# Patient Record
Sex: FEMALE | Race: WHITE | NOT HISPANIC OR LATINO | Employment: UNEMPLOYED | URBAN - METROPOLITAN AREA
[De-identification: names, ages, dates, MRNs, and addresses within clinical notes are randomized per-mention and may not be internally consistent; named-entity substitution may affect disease eponyms.]

---

## 2017-03-22 ENCOUNTER — HOSPITAL ENCOUNTER (OUTPATIENT)
Dept: RADIOLOGY | Facility: MEDICAL CENTER | Age: 47
End: 2017-03-22
Attending: FAMILY MEDICINE
Payer: COMMERCIAL

## 2017-03-22 DIAGNOSIS — Z12.39 SCREENING FOR BREAST CANCER: ICD-10-CM

## 2017-03-22 DIAGNOSIS — Z00.00 ROUTINE GENERAL MEDICAL EXAMINATION AT A HEALTH CARE FACILITY: ICD-10-CM

## 2017-03-22 PROCEDURE — 77063 BREAST TOMOSYNTHESIS BI: CPT

## 2018-04-13 ENCOUNTER — HOSPITAL ENCOUNTER (OUTPATIENT)
Dept: HOSPITAL 8 - CFH | Age: 48
Discharge: HOME | End: 2018-04-13
Attending: NURSE PRACTITIONER
Payer: COMMERCIAL

## 2018-04-13 DIAGNOSIS — Z12.31: Primary | ICD-10-CM

## 2018-04-13 PROCEDURE — 77063 BREAST TOMOSYNTHESIS BI: CPT

## 2019-01-16 ENCOUNTER — HOSPITAL ENCOUNTER (OUTPATIENT)
Dept: HOSPITAL 8 - CFH | Age: 49
Discharge: HOME | End: 2019-01-16
Attending: NURSE PRACTITIONER
Payer: COMMERCIAL

## 2019-01-16 DIAGNOSIS — R10.11: Primary | ICD-10-CM

## 2019-01-16 PROCEDURE — 76700 US EXAM ABDOM COMPLETE: CPT

## 2019-02-20 ENCOUNTER — HOSPITAL ENCOUNTER (OUTPATIENT)
Dept: HOSPITAL 8 - PETCFH | Age: 49
Discharge: HOME | End: 2019-02-20
Attending: INTERNAL MEDICINE
Payer: COMMERCIAL

## 2019-02-20 DIAGNOSIS — K76.0: ICD-10-CM

## 2019-02-20 DIAGNOSIS — R14.0: ICD-10-CM

## 2019-02-20 DIAGNOSIS — R11.0: ICD-10-CM

## 2019-02-20 DIAGNOSIS — R07.9: ICD-10-CM

## 2019-02-20 DIAGNOSIS — R10.13: Primary | ICD-10-CM

## 2019-02-20 PROCEDURE — 78227 HEPATOBIL SYST IMAGE W/DRUG: CPT

## 2019-02-20 PROCEDURE — A9537 TC99M MEBROFENIN: HCPCS

## 2019-04-11 ENCOUNTER — HOSPITAL ENCOUNTER (OUTPATIENT)
Dept: HOSPITAL 8 - STAR | Age: 49
Discharge: HOME | End: 2019-04-11
Attending: SURGERY
Payer: COMMERCIAL

## 2019-04-11 VITALS — SYSTOLIC BLOOD PRESSURE: 118 MMHG | DIASTOLIC BLOOD PRESSURE: 65 MMHG

## 2019-04-11 DIAGNOSIS — Z02.9: Primary | ICD-10-CM

## 2019-04-15 ENCOUNTER — HOSPITAL ENCOUNTER (OUTPATIENT)
Dept: HOSPITAL 8 - OUT | Age: 49
Discharge: HOME | End: 2019-04-15
Attending: SURGERY
Payer: COMMERCIAL

## 2019-04-15 VITALS — BODY MASS INDEX: 28.28 KG/M2 | HEIGHT: 70 IN | WEIGHT: 197.53 LBS

## 2019-04-15 DIAGNOSIS — J45.909: ICD-10-CM

## 2019-04-15 DIAGNOSIS — Z98.890: ICD-10-CM

## 2019-04-15 DIAGNOSIS — K82.8: Primary | ICD-10-CM

## 2019-04-15 DIAGNOSIS — Z88.1: ICD-10-CM

## 2019-04-15 DIAGNOSIS — E78.00: ICD-10-CM

## 2019-04-15 DIAGNOSIS — Z72.89: ICD-10-CM

## 2019-04-15 LAB — HCG UR SG: 1.01 (ref 1–1.03)

## 2019-04-15 PROCEDURE — 81025 URINE PREGNANCY TEST: CPT

## 2019-04-15 PROCEDURE — 47562 LAPAROSCOPIC CHOLECYSTECTOMY: CPT

## 2019-04-15 PROCEDURE — 88304 TISSUE EXAM BY PATHOLOGIST: CPT

## 2019-04-15 PROCEDURE — C1729 CATH, DRAINAGE: HCPCS

## 2019-04-15 RX ADMIN — OXYCODONE HYDROCHLORIDE PRN MG: 5 SOLUTION ORAL at 14:43

## 2019-04-15 RX ADMIN — FENTANYL CITRATE PRN MCG: 50 INJECTION INTRAMUSCULAR; INTRAVENOUS at 13:30

## 2019-04-15 RX ADMIN — FENTANYL CITRATE PRN MCG: 50 INJECTION INTRAMUSCULAR; INTRAVENOUS at 12:43

## 2019-04-15 RX ADMIN — FENTANYL CITRATE PRN MCG: 50 INJECTION INTRAMUSCULAR; INTRAVENOUS at 13:25

## 2019-04-15 RX ADMIN — FENTANYL CITRATE PRN MCG: 50 INJECTION INTRAMUSCULAR; INTRAVENOUS at 12:53

## 2019-04-15 RX ADMIN — FENTANYL CITRATE PRN MCG: 50 INJECTION INTRAMUSCULAR; INTRAVENOUS at 13:20

## 2019-04-15 RX ADMIN — OXYCODONE HYDROCHLORIDE PRN MG: 5 SOLUTION ORAL at 12:46

## 2019-04-29 ENCOUNTER — HOSPITAL ENCOUNTER (OUTPATIENT)
Dept: HOSPITAL 8 - CFH | Age: 49
Discharge: HOME | End: 2019-04-29
Attending: NURSE PRACTITIONER
Payer: COMMERCIAL

## 2019-04-29 DIAGNOSIS — Z88.1: ICD-10-CM

## 2019-04-29 DIAGNOSIS — Z12.31: Primary | ICD-10-CM

## 2019-04-29 PROCEDURE — 77063 BREAST TOMOSYNTHESIS BI: CPT

## 2019-05-13 ENCOUNTER — HOSPITAL ENCOUNTER (OUTPATIENT)
Dept: HOSPITAL 8 - CFH | Age: 49
Discharge: HOME | End: 2019-05-13
Attending: NURSE PRACTITIONER
Payer: COMMERCIAL

## 2019-05-13 DIAGNOSIS — N63.21: Primary | ICD-10-CM

## 2019-05-23 ENCOUNTER — HOSPITAL ENCOUNTER (OUTPATIENT)
Dept: HOSPITAL 8 - CFH | Age: 49
Discharge: HOME | End: 2019-05-23
Attending: NURSE PRACTITIONER
Payer: COMMERCIAL

## 2019-05-23 DIAGNOSIS — N60.22: Primary | ICD-10-CM

## 2019-05-23 PROCEDURE — 88342 IMHCHEM/IMCYTCHM 1ST ANTB: CPT

## 2019-05-23 PROCEDURE — 19083 BX BREAST 1ST LESION US IMAG: CPT

## 2019-05-23 PROCEDURE — 88341 IMHCHEM/IMCYTCHM EA ADD ANTB: CPT

## 2019-05-23 PROCEDURE — 88305 TISSUE EXAM BY PATHOLOGIST: CPT

## 2021-04-06 ENCOUNTER — TELEPHONE (OUTPATIENT)
Dept: MEDICAL GROUP | Facility: MEDICAL CENTER | Age: 51
End: 2021-04-06

## 2021-04-06 ENCOUNTER — OFFICE VISIT (OUTPATIENT)
Dept: MEDICAL GROUP | Facility: MEDICAL CENTER | Age: 51
End: 2021-04-06
Attending: PHYSICIAN ASSISTANT
Payer: COMMERCIAL

## 2021-04-06 VITALS
HEIGHT: 70 IN | RESPIRATION RATE: 18 BRPM | SYSTOLIC BLOOD PRESSURE: 126 MMHG | WEIGHT: 193.5 LBS | TEMPERATURE: 97.6 F | OXYGEN SATURATION: 97 % | HEART RATE: 85 BPM | BODY MASS INDEX: 27.7 KG/M2 | DIASTOLIC BLOOD PRESSURE: 70 MMHG

## 2021-04-06 DIAGNOSIS — Z00.00 HEALTH CARE MAINTENANCE: ICD-10-CM

## 2021-04-06 DIAGNOSIS — J45.40 MODERATE PERSISTENT ASTHMA WITHOUT COMPLICATION: ICD-10-CM

## 2021-04-06 DIAGNOSIS — Z12.31 ENCOUNTER FOR SCREENING MAMMOGRAM FOR MALIGNANT NEOPLASM OF BREAST: ICD-10-CM

## 2021-04-06 DIAGNOSIS — T78.2XXA ANAPHYLAXIS, INITIAL ENCOUNTER: ICD-10-CM

## 2021-04-06 PROBLEM — J45.909 ASTHMA: Status: ACTIVE | Noted: 2017-03-02

## 2021-04-06 PROBLEM — R53.83 FATIGUE: Status: ACTIVE | Noted: 2017-03-02

## 2021-04-06 PROBLEM — E55.9 VITAMIN D DEFICIENCY: Status: ACTIVE | Noted: 2017-03-02

## 2021-04-06 PROCEDURE — 99204 OFFICE O/P NEW MOD 45 MIN: CPT | Performed by: PHYSICIAN ASSISTANT

## 2021-04-06 PROCEDURE — 99203 OFFICE O/P NEW LOW 30 MIN: CPT | Performed by: PHYSICIAN ASSISTANT

## 2021-04-06 RX ORDER — EPINEPHRINE 0.3 MG/.3ML
0.3 INJECTION SUBCUTANEOUS ONCE
Qty: 0.3 ML | Refills: 5 | Status: SHIPPED | OUTPATIENT
Start: 2021-04-06 | End: 2021-04-06

## 2021-04-06 RX ORDER — MONTELUKAST SODIUM 10 MG/1
10 TABLET ORAL DAILY
Qty: 30 TABLET | Refills: 5 | Status: SHIPPED | OUTPATIENT
Start: 2021-04-06 | End: 2021-12-06

## 2021-04-06 RX ORDER — ALBUTEROL SULFATE 90 UG/1
2 AEROSOL, METERED RESPIRATORY (INHALATION) EVERY 6 HOURS PRN
Qty: 8.5 G | Refills: 11 | Status: SHIPPED | OUTPATIENT
Start: 2021-04-06

## 2021-04-06 RX ORDER — ALBUTEROL SULFATE 90 UG/1
2 AEROSOL, METERED RESPIRATORY (INHALATION) EVERY 6 HOURS PRN
COMMUNITY
End: 2021-04-06 | Stop reason: SDUPTHER

## 2021-04-06 NOTE — PROGRESS NOTES
Chief Complaint   Patient presents with   • Asthma   • Medication Refill   • Requesting Labs     mammogram     Subjective:     HPI:   Betty Chicas is a 51 y.o. female here to establish care  and to discuss the evaluation and management of:    Asthma  This is a chronic condition.  Onset: Symptoms present since childhood.   Symptoms include: wheezing, SOB.   Symptoms are exacerbated in certain seasons, such as summer.  Reports problems with activity/exercise induced coughing, wheezing, or shortness of breath.  She prophylactically takes albuterol prior to exercise.  Sleep has been disturbed nightly due to symptoms during high allergy season.    She uses her albuterol for relief of symptoms daily allergy seasons.  Current medications: Breo once daily and albuterol.     ROS  See HPI.     Allergies   Allergen Reactions   • Amoxicillin Rash       Current medicines (including changes today)  Current Outpatient Medications   Medication Sig Dispense Refill   • EPINEPHrine (EPIPEN 2-DWAIN) 0.3 MG/0.3ML Solution Auto-injector solution for injection Inject 0.3 mL into the shoulder, thigh, or buttocks one time for 1 dose. 0.3 mL 5   • albuterol 108 (90 Base) MCG/ACT Aero Soln inhalation aerosol Inhale 2 Puffs every 6 hours as needed for Shortness of Breath. 8.5 g 11   • Fluticasone Furoate-Vilanterol (BREO ELLIPTA) 200-25 MCG/INH AEROSOL POWDER, BREATH ACTIVATED Inhale 1 Puff every day. 28 Each 5   • montelukast (SINGULAIR) 10 MG Tab Take 1 tablet by mouth every day. 30 tablet 5     No current facility-administered medications for this visit.     She  has a past medical history of Asthma and Hyperlipidemia.  She  has a past surgical history that includes cholecystectomy; tonsillectomy; and dilation and curettage.  Social History     Tobacco Use   • Smoking status: Never Smoker   • Smokeless tobacco: Never Used   Substance Use Topics   • Alcohol use: Yes     Comment: OCC    • Drug use: Never       Family History   Problem Relation  "Age of Onset   • Hypertension Maternal Grandmother    • Lung Disease Neg Hx    • Cancer Neg Hx    • Heart Disease Neg Hx    • Diabetes Neg Hx    • Hyperlipidemia Neg Hx      No family status information on file.       Patient Active Problem List    Diagnosis Date Noted   • Asthma 03/02/2017   • Fatigue 03/02/2017   • Vitamin D deficiency 03/02/2017        Objective:     /70 (BP Location: Left arm, Patient Position: Sitting, BP Cuff Size: Adult long)   Pulse 85   Temp 36.4 °C (97.6 °F) (Temporal)   Resp 18   Ht 1.778 m (5' 10\")   Wt 87.8 kg (193 lb 8 oz)   SpO2 97%  Body mass index is 27.76 kg/m².    Physical Exam:  Physical Exam   Constitutional: She is oriented to person, place, and time and well-developed, well-nourished, and in no distress.   HENT:   Head: Normocephalic.   Right Ear: External ear normal.   Left Ear: External ear normal.   Eyes: Pupils are equal, round, and reactive to light.   Neck: No thyromegaly present.   Cardiovascular: Normal rate, regular rhythm and normal heart sounds.   Pulmonary/Chest: Effort normal and breath sounds normal.   Lymphadenopathy:     She has no cervical adenopathy.        Right: No supraclavicular adenopathy present.        Left: No supraclavicular adenopathy present.   Neurological: She is alert and oriented to person, place, and time. Gait normal.   Skin: Skin is warm and dry.   Psychiatric: Affect and judgment normal.   Vitals reviewed.    Assessment and Plan:     The following treatment plan was discussed:    1. Moderate persistent asthma without complication  - This is a chronic condition.  -Plan: Due to high allergy season approaching we will have her continue with albuterol as needed and high dose Breo once daily.  Encouraged patient to also continue taking Claritin nightly.  We will add on montelukast due to asthma and allergies in an attempt to control her allergies and ultimately decrease the use of her rescue inhaler.  We will follow-up in 6 weeks for " reevaluation.  If she continues to use her rescue inhaler daily and continues to have nighttime awakenings due to asthma symptoms, we will likely need to switch her to a LABA/ICS combination inhaler for daily use.  - albuterol 108 (90 Base) MCG/ACT Aero Soln inhalation aerosol; Inhale 2 Puffs every 6 hours as needed for Shortness of Breath.  Dispense: 8.5 g; Refill: 11  - Fluticasone Furoate-Vilanterol (BREO ELLIPTA) 200-25 MCG/INH AEROSOL POWDER, BREATH ACTIVATED; Inhale 1 Puff every day.  Dispense: 28 Each; Refill: 5  - montelukast (SINGULAIR) 10 MG Tab; Take 1 tablet by mouth every day.  Dispense: 30 tablet; Refill: 5    2. Health care maintenance  - Betty is due for yearly labs. She has been instructed to complete these labs prior to her next follow-up.  - T-TRANSGLUTAMINASE (TTG) IGA; Future  - CBC WITHOUT DIFFERENTIAL; Future  - Comp Metabolic Panel; Future  - HEMOGLOBIN A1C; Future  - Lipid Profile; Future  - TSH WITH REFLEX TO FT4; Future  - VITAMIN D,25 HYDROXY; Future  - VITAMIN B12; Future  - FOLATE; Future    3. Encounter for screening mammogram for malignant neoplasm of breast  - MA-DIAGNOSTIC MAMMO BILAT W/TOMOSYNTHESIS W/CAD; Future    4. Anaphylaxis, initial encounter  - EPINEPHrine (EPIPEN 2-DWAIN) 0.3 MG/0.3ML Solution Auto-injector solution for injection; Inject 0.3 mL into the shoulder, thigh, or buttocks one time for 1 dose.  Dispense: 0.3 mL; Refill: 5       Any change or worsening of signs or symptoms, patient encouraged to follow-up or report to emergency room for further evaluation. Patient verbalizes understanding and agrees.    Follow-Up: Return in about 6 years (around 4/6/2027), or if symptoms worsen or fail to improve, for Med check, F/u labs.      PLEASE NOTE: This dictation was created using voice recognition software. I have made every reasonable attempt to correct obvious errors, but I expect that there are errors of grammar and possibly content that I did not discover before  finalizing the note.

## 2021-04-06 NOTE — PATIENT INSTRUCTIONS
Goals and Recommendations for Weight loss:     - Combination of diet, exercise and behavioral interventions are important!    - Diet: The goal of dietary therapy is to REDUCE the total number of calories consumed per day. Another words, you  should burn more calories during the day than the amount of calories that you eat in a day.     1. Focus on well balanced healthful foods such as DASH diet or Mediterranean style diet.   2. Reduce the amount of refined carbohydrates, processed meats and foods high in salt and trans fat.  3. MODERATION of unprocessed red meats, poultry, eggs and milk  4. Focus on high intake of fruits, nuts, fish, lean white meats (chicken, turkey, etc), veggies, veggie oils, minimally processed whole grains, legumes, and yogurts.     - Exercise: Physical activity helps with weight loss maintenance     1. Exercise should be performed for at least 30 minutes or more, 5-7 days a week to prevent weight gain and improve cardiovascular health.  2.Gradually increase your physical activity as tolerated over time  3. A combination of aerobic (treadmill, bike, walking, hiking, swimming, kickboxing, etc) and resistance training (weight machines, dumbbells, kettle bells, sand bags, medicine balls, or resistance bands)  is preferred.  4. Stay adequately hydrated!     - Behavioral Modifications: Goal is to make long term changes in eating behavior by modifying and monitoring food intake, physical activity and controlling cues and stimuli in the environment that trigger eating.    1. Keep a diary   - Write down what you have eaten and how much you eat everyday.    - Write down what physical activity you performed each day and for how long.   - Write down how much water you drink daily.   - Did you consume more calories then you burned off during the day? (another words, were you in a calorie deficit for the day? - this is our goal).  - Seeing a behavioral specialist will help you track all of these things and  help make adjustments as needed to aid you in success.    Things to Remember:   - Losing weight does not happen over night.  - BABY STEPS! Focus on a goal of losing 5-7% of your body weight and once achieved repeat.  - It requires a lifestyle change.  - Having a good support system is important.  - Losing weight will significantly reduce your risk of: heart disease, high cholesterol, high blood pressure and diabetes!

## 2021-04-06 NOTE — ASSESSMENT & PLAN NOTE
This is a chronic condition.  Onset: Symptoms present since childhood.   Symptoms include: wheezing, SOB.   Symptoms are exacerbated in certain seasons, such as summer.  Reports problems with activity/exercise induced coughing, wheezing, or shortness of breath.  She prophylactically takes albuterol prior to exercise.  Sleep has been disturbed nightly due to symptoms during high allergy season.    She uses her albuterol for relief of symptoms daily allergy seasons.  Current medications: Breo once daily and albuterol.

## 2021-04-07 ENCOUNTER — TELEPHONE (OUTPATIENT)
Dept: MEDICAL GROUP | Facility: MEDICAL CENTER | Age: 51
End: 2021-04-07

## 2021-04-07 NOTE — TELEPHONE ENCOUNTER
DOCUMENTATION OF PAR STATUS:    1. Name of Medication & Dose: Breo Ellipta 200-25mcg     2. Name of Prescription Coverage Company & phone #: [employer]    3. Date Prior Auth Submitted: 4/6/2021    4. What information was given to obtain insurance decision? Medication list, rchart notes    5. Prior Auth Status? Pending    6. Patient Notified: N\A

## 2021-04-08 DIAGNOSIS — J45.40 MODERATE PERSISTENT ASTHMA WITHOUT COMPLICATION: ICD-10-CM

## 2021-04-08 RX ORDER — BUDESONIDE AND FORMOTEROL FUMARATE DIHYDRATE 160; 4.5 UG/1; UG/1
2 AEROSOL RESPIRATORY (INHALATION) 2 TIMES DAILY
Qty: 10.2 G | Refills: 5 | Status: SHIPPED | OUTPATIENT
Start: 2021-04-08

## 2021-04-23 ENCOUNTER — HOSPITAL ENCOUNTER (OUTPATIENT)
Dept: LAB | Facility: MEDICAL CENTER | Age: 51
End: 2021-04-23
Attending: PHYSICIAN ASSISTANT
Payer: COMMERCIAL

## 2021-04-23 DIAGNOSIS — Z00.00 HEALTH CARE MAINTENANCE: ICD-10-CM

## 2021-04-23 LAB
ALBUMIN SERPL BCP-MCNC: 4.1 G/DL (ref 3.2–4.9)
ALBUMIN/GLOB SERPL: 1.3 G/DL
ALP SERPL-CCNC: 54 U/L (ref 30–99)
ALT SERPL-CCNC: 23 U/L (ref 2–50)
ANION GAP SERPL CALC-SCNC: 6 MMOL/L (ref 7–16)
AST SERPL-CCNC: 26 U/L (ref 12–45)
BILIRUB SERPL-MCNC: 0.6 MG/DL (ref 0.1–1.5)
BUN SERPL-MCNC: 13 MG/DL (ref 8–22)
CALCIUM SERPL-MCNC: 9.5 MG/DL (ref 8.5–10.5)
CHLORIDE SERPL-SCNC: 107 MMOL/L (ref 96–112)
CHOLEST SERPL-MCNC: 232 MG/DL (ref 100–199)
CO2 SERPL-SCNC: 24 MMOL/L (ref 20–33)
CREAT SERPL-MCNC: 0.79 MG/DL (ref 0.5–1.4)
ERYTHROCYTE [DISTWIDTH] IN BLOOD BY AUTOMATED COUNT: 46.3 FL (ref 35.9–50)
EST. AVERAGE GLUCOSE BLD GHB EST-MCNC: 94 MG/DL
GLOBULIN SER CALC-MCNC: 3.2 G/DL (ref 1.9–3.5)
GLUCOSE SERPL-MCNC: 95 MG/DL (ref 65–99)
HBA1C MFR BLD: 4.9 % (ref 4–5.6)
HCT VFR BLD AUTO: 45.9 % (ref 37–47)
HDLC SERPL-MCNC: 89 MG/DL
HGB BLD-MCNC: 15.4 G/DL (ref 12–16)
LDLC SERPL CALC-MCNC: 132 MG/DL
MCH RBC QN AUTO: 33.6 PG (ref 27–33)
MCHC RBC AUTO-ENTMCNC: 33.6 G/DL (ref 33.6–35)
MCV RBC AUTO: 100 FL (ref 81.4–97.8)
PLATELET # BLD AUTO: 197 K/UL (ref 164–446)
PMV BLD AUTO: 12.3 FL (ref 9–12.9)
POTASSIUM SERPL-SCNC: 4.5 MMOL/L (ref 3.6–5.5)
PROT SERPL-MCNC: 7.3 G/DL (ref 6–8.2)
RBC # BLD AUTO: 4.59 M/UL (ref 4.2–5.4)
SODIUM SERPL-SCNC: 137 MMOL/L (ref 135–145)
TRIGL SERPL-MCNC: 57 MG/DL (ref 0–149)
WBC # BLD AUTO: 6.5 K/UL (ref 4.8–10.8)

## 2021-04-23 PROCEDURE — 82746 ASSAY OF FOLIC ACID SERUM: CPT

## 2021-04-23 PROCEDURE — 83516 IMMUNOASSAY NONANTIBODY: CPT

## 2021-04-23 PROCEDURE — 80053 COMPREHEN METABOLIC PANEL: CPT

## 2021-04-23 PROCEDURE — 85027 COMPLETE CBC AUTOMATED: CPT

## 2021-04-23 PROCEDURE — 83036 HEMOGLOBIN GLYCOSYLATED A1C: CPT

## 2021-04-23 PROCEDURE — 82306 VITAMIN D 25 HYDROXY: CPT

## 2021-04-23 PROCEDURE — 80061 LIPID PANEL: CPT

## 2021-04-23 PROCEDURE — 82607 VITAMIN B-12: CPT

## 2021-04-23 PROCEDURE — 36415 COLL VENOUS BLD VENIPUNCTURE: CPT

## 2021-04-23 PROCEDURE — 84443 ASSAY THYROID STIM HORMONE: CPT

## 2021-04-24 LAB
25(OH)D3 SERPL-MCNC: 23 NG/ML (ref 30–80)
FOLATE SERPL-MCNC: 9.2 NG/ML
TSH SERPL DL<=0.005 MIU/L-ACNC: 2.7 UIU/ML (ref 0.38–5.33)
VIT B12 SERPL-MCNC: 272 PG/ML (ref 211–911)

## 2021-04-25 LAB — TTG IGA SER IA-ACNC: <2 U/ML (ref 0–3)

## 2021-05-07 ENCOUNTER — HOSPITAL ENCOUNTER (OUTPATIENT)
Dept: RADIOLOGY | Facility: MEDICAL CENTER | Age: 51
End: 2021-05-07
Payer: COMMERCIAL

## 2021-05-10 ENCOUNTER — HOSPITAL ENCOUNTER (OUTPATIENT)
Dept: RADIOLOGY | Facility: MEDICAL CENTER | Age: 51
End: 2021-05-10
Attending: PHYSICIAN ASSISTANT
Payer: COMMERCIAL

## 2021-05-10 DIAGNOSIS — Z12.31 ENCOUNTER FOR SCREENING MAMMOGRAM FOR MALIGNANT NEOPLASM OF BREAST: ICD-10-CM

## 2021-05-10 PROCEDURE — 76642 ULTRASOUND BREAST LIMITED: CPT | Mod: RT

## 2021-05-10 PROCEDURE — G0279 TOMOSYNTHESIS, MAMMO: HCPCS

## 2021-05-13 ENCOUNTER — HOSPITAL ENCOUNTER (OUTPATIENT)
Dept: RADIOLOGY | Facility: MEDICAL CENTER | Age: 51
End: 2021-05-13
Attending: PHYSICIAN ASSISTANT
Payer: COMMERCIAL

## 2021-05-13 DIAGNOSIS — R92.8 ABNORMAL FINDINGS ON DIAGNOSTIC IMAGING OF BREAST: ICD-10-CM

## 2021-05-13 LAB — PATHOLOGY CONSULT NOTE: NORMAL

## 2021-05-13 PROCEDURE — 19083 BX BREAST 1ST LESION US IMAG: CPT | Mod: RT

## 2021-05-13 PROCEDURE — 88112 CYTOPATH CELL ENHANCE TECH: CPT

## 2021-05-13 PROCEDURE — 88305 TISSUE EXAM BY PATHOLOGIST: CPT

## 2021-05-13 PROCEDURE — 19084 BX BREAST ADD LESION US IMAG: CPT | Mod: RT

## 2021-05-17 ENCOUNTER — NON-PROVIDER VISIT (OUTPATIENT)
Dept: URGENT CARE | Facility: CLINIC | Age: 51
End: 2021-05-17

## 2021-05-17 ENCOUNTER — TELEPHONE (OUTPATIENT)
Dept: RADIOLOGY | Facility: MEDICAL CENTER | Age: 51
End: 2021-05-17

## 2021-05-17 DIAGNOSIS — Z02.1 PRE-EMPLOYMENT DRUG SCREENING: ICD-10-CM

## 2021-05-17 LAB
AMP AMPHETAMINE: NORMAL
COC COCAINE: NORMAL
INT CON NEG: NORMAL
INT CON POS: NORMAL
MET METHAMPHETAMINES: NORMAL
OPI OPIATES: NORMAL
PCP PHENCYCLIDINE: NORMAL
POC DRUG COMMENT 753798-OCCUPATIONAL HEALTH: NEGATIVE
THC: NORMAL

## 2021-05-17 PROCEDURE — 80305 DRUG TEST PRSMV DIR OPT OBS: CPT | Performed by: NURSE PRACTITIONER

## 2021-05-18 ENCOUNTER — TELEPHONE (OUTPATIENT)
Dept: MEDICAL GROUP | Facility: MEDICAL CENTER | Age: 51
End: 2021-05-18

## 2021-05-18 ENCOUNTER — OFFICE VISIT (OUTPATIENT)
Dept: MEDICAL GROUP | Facility: MEDICAL CENTER | Age: 51
End: 2021-05-18
Attending: PHYSICIAN ASSISTANT
Payer: COMMERCIAL

## 2021-05-18 VITALS
BODY MASS INDEX: 28.59 KG/M2 | RESPIRATION RATE: 18 BRPM | TEMPERATURE: 98.2 F | HEART RATE: 73 BPM | OXYGEN SATURATION: 98 % | SYSTOLIC BLOOD PRESSURE: 113 MMHG | WEIGHT: 199.7 LBS | HEIGHT: 70 IN | DIASTOLIC BLOOD PRESSURE: 70 MMHG

## 2021-05-18 DIAGNOSIS — Z01.82 ENCOUNTER FOR ALLERGY TESTING: ICD-10-CM

## 2021-05-18 DIAGNOSIS — R14.0 ABDOMINAL BLOATING: ICD-10-CM

## 2021-05-18 DIAGNOSIS — J45.40 MODERATE PERSISTENT ASTHMA WITHOUT COMPLICATION: ICD-10-CM

## 2021-05-18 PROCEDURE — 99213 OFFICE O/P EST LOW 20 MIN: CPT | Performed by: PHYSICIAN ASSISTANT

## 2021-05-18 PROCEDURE — 99214 OFFICE O/P EST MOD 30 MIN: CPT | Performed by: PHYSICIAN ASSISTANT

## 2021-05-18 PROCEDURE — 99203 OFFICE O/P NEW LOW 30 MIN: CPT | Performed by: PHYSICIAN ASSISTANT

## 2021-05-18 ASSESSMENT — FIBROSIS 4 INDEX: FIB4 SCORE: 1.38

## 2021-05-18 ASSESSMENT — PATIENT HEALTH QUESTIONNAIRE - PHQ9: CLINICAL INTERPRETATION OF PHQ2 SCORE: 0

## 2021-05-18 NOTE — ASSESSMENT & PLAN NOTE
Betty presents today for follow up. She reports concerns regarding food allergies. She has never been tested for this in the past.

## 2021-05-18 NOTE — PROGRESS NOTES
Chief Complaint   Patient presents with   • Other     lab results       Subjective:     HPI:   Betty Chicas is a 51 y.o. female here to discuss the evaluation and management of:    Asthma  Betty presents today for follow up. She has been on high dose Symbicort daily for the past 6 weeks. She reports that she has had to use her rescue inhaler at minimum 3 days a week and when on Advair previously, she had to use her rescue inhaler daily. She has had best asthma control when on Breo where she never had to use her rescue inhaler. She reports that she will be starting a new job that requires her to active and outside throughout the day. Therefore, having her asthma well controlled is imperative to her success.     Abdominal bloating  Betty presents today for follow up and lab review. She reports continued abdominal bloating after eating meals. No red flag signs.     Encounter for allergy testing  Betty presents today for follow up. She reports concerns regarding food allergies. She has never been tested for this in the past.    ROS  See HPI.     Allergies   Allergen Reactions   • Amoxicillin Rash       Current medicines (including changes today)  Current Outpatient Medications   Medication Sig Dispense Refill   • Fluticasone Furoate-Vilanterol (BREO ELLIPTA) 200-25 MCG/INH AEROSOL POWDER, BREATH ACTIVATED Inhale 1 Puff every day. 28 Each 5   • albuterol 108 (90 Base) MCG/ACT Aero Soln inhalation aerosol Inhale 2 Puffs every 6 hours as needed for Shortness of Breath. 8.5 g 11   • montelukast (SINGULAIR) 10 MG Tab Take 1 tablet by mouth every day. 30 tablet 5   • budesonide-formoterol (SYMBICORT) 160-4.5 MCG/ACT Aerosol Inhale 2 Puffs 2 times a day. 10.2 g 5     No current facility-administered medications for this visit.       Social History     Tobacco Use   • Smoking status: Never Smoker   • Smokeless tobacco: Never Used   Vaping Use   • Vaping Use: Never used   Substance Use Topics   • Alcohol use: Yes     Comment:  "OCC    • Drug use: Never       Patient Active Problem List    Diagnosis Date Noted   • Abdominal bloating 05/18/2021   • Encounter for allergy testing 05/18/2021   • Asthma 03/02/2017   • Fatigue 03/02/2017   • Vitamin D deficiency 03/02/2017       Family History   Problem Relation Age of Onset   • Hypertension Maternal Grandmother    • Lung Disease Neg Hx    • Cancer Neg Hx    • Heart Disease Neg Hx    • Diabetes Neg Hx    • Hyperlipidemia Neg Hx         Objective:     /70 (BP Location: Left arm, Patient Position: Sitting, BP Cuff Size: Adult)   Pulse 73   Temp 36.8 °C (98.2 °F) (Temporal)   Resp 18   Ht 1.778 m (5' 10\")   Wt 90.6 kg (199 lb 11.2 oz)   SpO2 98%  Body mass index is 28.65 kg/m².    Physical Exam:  Physical Exam  Constitutional:       Appearance: Normal appearance.   Cardiovascular:      Rate and Rhythm: Normal rate and regular rhythm.      Heart sounds: Normal heart sounds.   Pulmonary:      Effort: Pulmonary effort is normal.      Breath sounds: Normal breath sounds.   Skin:     General: Skin is warm and dry.   Neurological:      Mental Status: She is alert and oriented to person, place, and time.      Gait: Gait is intact.   Psychiatric:         Mood and Affect: Affect normal.         Judgment: Judgment normal.       Assessment and Plan:     The following treatment plan was discussed:    1. Moderate persistent asthma without complication  - This is a chronic condition.  - Patient is having to use her rescue inhaler >2 days a week since starting on the Symbicort. She has failed both Advair and Symbicort as maintenance therapy for asthma control. She has had best control with Breo, as she did not use her rescue inhaler at all.  - Plan: We will prescribe Breo and submit a PA for this medication. She will be starting a job that requires her to be active in which she will need to have her asthma well controlled in order to perform her job efficiently.     2. Abdominal bloating  - This is a " chronic condition.  - Labs reviewed and came back normal for possible etiologies.  - Plan: Screen for h Pylori as potential causes to abdominal bloating.   - H. PYLORI, UREA BREATH TEST, ADULT; Future    3. Encounter for allergy testing  - REFERRAL TO ALLERGY for food allergy testing.      Any change or worsening of signs or symptoms, patient encouraged to follow-up or report to emergency room for further evaluation. Patient verbalizes understanding and agrees.    Follow-Up: Return if symptoms worsen or fail to improve.      PLEASE NOTE: This dictation was created using voice recognition software. I have made every reasonable attempt to correct obvious errors, but I expect that there are errors of grammar and possibly content that I did not discover before finalizing the note.

## 2021-05-18 NOTE — ASSESSMENT & PLAN NOTE
Betty presents today for follow up. She has been on high dose Symbicort daily for the past 6 weeks. She reports that she has had to use her rescue inhaler at minimum 3 days a week and when on Advair previously, she had to use her rescue inhaler daily. She has had best asthma control when on Breo where she never had to use her rescue inhaler. She reports that she will be starting a new job that requires her to active and outside throughout the day. Therefore, having her asthma well controlled is imperative to her success.

## 2021-05-18 NOTE — ASSESSMENT & PLAN NOTE
Betty presents today for follow up and lab review. She reports continued abdominal bloating after eating meals. No red flag signs.

## 2021-05-18 NOTE — TELEPHONE ENCOUNTER
DOCUMENTATION OF PAR STATUS:    1. Name of Medication & Dose:  Fluticasone Furoate-Vilanterol (BREO ELLIPTA) 200-25 MCG/INH AEROSOL POWDER, BREATH ACTIVATED & Dose: 1 Puff      2. Name of Prescription Coverage Company & phone #: [employer] & [phone]    3. Date Prior Auth Submitted: 5/18/21    4. What information was given to obtain insurance decision? chart notes,icd-10 codes,history meds, pt information    5. Prior Auth Status? Pending    6. Patient Notified: N\A

## 2021-05-20 ENCOUNTER — TELEPHONE (OUTPATIENT)
Dept: MEDICAL GROUP | Facility: MEDICAL CENTER | Age: 51
End: 2021-05-20

## 2021-05-20 NOTE — TELEPHONE ENCOUNTER
FINAL PRIOR AUTHORIZATION STATUS:    1.  Name of Medication & Dose:  Fluticasone Furoate-Vilanterol (BREO ELLIPTA) 200-25 MCG/INH AEROSOL POWDER, BREATH ACTIVATED      2. Prior Auth Status: Approved through [employer] [facility]     3. Action Taken: Pharmacy Notified: yes Patient Notified: yes

## 2021-05-25 ENCOUNTER — HOSPITAL ENCOUNTER (OUTPATIENT)
Dept: LAB | Facility: MEDICAL CENTER | Age: 51
End: 2021-05-25
Attending: PHYSICIAN ASSISTANT
Payer: COMMERCIAL

## 2021-05-25 DIAGNOSIS — R14.0 ABDOMINAL BLOATING: ICD-10-CM

## 2021-05-25 PROCEDURE — 83013 H PYLORI (C-13) BREATH: CPT

## 2021-05-27 LAB — UREA BREATH TEST QL: NEGATIVE

## 2021-12-03 DIAGNOSIS — J45.40 MODERATE PERSISTENT ASTHMA WITHOUT COMPLICATION: ICD-10-CM

## 2021-12-06 RX ORDER — MONTELUKAST SODIUM 10 MG/1
TABLET ORAL
Qty: 30 TABLET | Refills: 0 | Status: SHIPPED | OUTPATIENT
Start: 2021-12-06 | End: 2022-03-09

## 2023-04-13 ENCOUNTER — HOSPITAL ENCOUNTER (OUTPATIENT)
Facility: MEDICAL CENTER | Age: 53
End: 2023-04-13
Attending: STUDENT IN AN ORGANIZED HEALTH CARE EDUCATION/TRAINING PROGRAM
Payer: COMMERCIAL

## 2023-04-13 ENCOUNTER — GYNECOLOGY VISIT (OUTPATIENT)
Dept: OBGYN | Facility: CLINIC | Age: 53
End: 2023-04-13
Payer: COMMERCIAL

## 2023-04-13 VITALS
DIASTOLIC BLOOD PRESSURE: 74 MMHG | SYSTOLIC BLOOD PRESSURE: 136 MMHG | WEIGHT: 177 LBS | BODY MASS INDEX: 25.34 KG/M2 | HEIGHT: 70 IN

## 2023-04-13 DIAGNOSIS — Z12.39 SCREENING BREAST EXAMINATION: ICD-10-CM

## 2023-04-13 DIAGNOSIS — Z12.4 SCREENING FOR MALIGNANT NEOPLASM OF CERVIX: ICD-10-CM

## 2023-04-13 DIAGNOSIS — N95.1 MENOPAUSAL AND FEMALE CLIMACTERIC STATES: ICD-10-CM

## 2023-04-13 DIAGNOSIS — Z01.419 ENCOUNTER FOR GYNECOLOGICAL EXAMINATION (GENERAL) (ROUTINE) WITHOUT ABNORMAL FINDINGS: ICD-10-CM

## 2023-04-13 LAB
ESTRADIOL SERPL-MCNC: 30.8 PG/ML
FSH SERPL-ACNC: 64 MIU/ML
LH SERPL-ACNC: 55.4 IU/L
PROGEST SERPL-MCNC: 0.12 NG/ML
TSH SERPL DL<=0.005 MIU/L-ACNC: 3.37 UIU/ML (ref 0.38–5.33)

## 2023-04-13 PROCEDURE — 84144 ASSAY OF PROGESTERONE: CPT

## 2023-04-13 PROCEDURE — 88175 CYTOPATH C/V AUTO FLUID REDO: CPT

## 2023-04-13 PROCEDURE — 99386 PREV VISIT NEW AGE 40-64: CPT | Performed by: STUDENT IN AN ORGANIZED HEALTH CARE EDUCATION/TRAINING PROGRAM

## 2023-04-13 PROCEDURE — 83002 ASSAY OF GONADOTROPIN (LH): CPT

## 2023-04-13 PROCEDURE — 84443 ASSAY THYROID STIM HORMONE: CPT

## 2023-04-13 PROCEDURE — 87624 HPV HI-RISK TYP POOLED RSLT: CPT

## 2023-04-13 PROCEDURE — 36415 COLL VENOUS BLD VENIPUNCTURE: CPT | Performed by: STUDENT IN AN ORGANIZED HEALTH CARE EDUCATION/TRAINING PROGRAM

## 2023-04-13 PROCEDURE — 82670 ASSAY OF TOTAL ESTRADIOL: CPT

## 2023-04-13 PROCEDURE — 83001 ASSAY OF GONADOTROPIN (FSH): CPT

## 2023-04-13 RX ORDER — LORATADINE 10 MG/1
TABLET ORAL
COMMUNITY
Start: 2023-04-11

## 2023-04-13 RX ORDER — PROGESTERONE 100 MG/1
100 CAPSULE ORAL DAILY
Qty: 30 CAPSULE | Refills: 11 | Status: SHIPPED | OUTPATIENT
Start: 2023-04-13 | End: 2024-02-27 | Stop reason: SDUPTHER

## 2023-04-13 RX ORDER — ESTRADIOL 0.5 MG/1
0.5 TABLET ORAL DAILY
Qty: 30 TABLET | Refills: 11 | Status: SHIPPED | OUTPATIENT
Start: 2023-04-13 | End: 2024-02-27

## 2023-04-13 RX ORDER — LEVOCETIRIZINE DIHYDROCHLORIDE 2.5 MG/5ML
SOLUTION ORAL
COMMUNITY
Start: 2022-06-01

## 2023-04-13 RX ORDER — FLUTICASONE PROPIONATE 50 MCG
SPRAY, SUSPENSION (ML) NASAL
COMMUNITY
Start: 2022-06-01

## 2023-04-13 ASSESSMENT — FIBROSIS 4 INDEX: FIB4 SCORE: 1.43

## 2023-04-13 NOTE — PROGRESS NOTES
ANNUAL GYNECOLOGY VISIT    Chief Complaint  Annual exam; desires to discuss HRT      Subjective  Betty Chicas is a 53 y.o. female who presents today for Annual Exam.  LMP 2023.  Reports periods have become lighter and more irregular over the last year.  She had a period in December that lasted 2 weeks. Her next period after that was not until March, lasted only 3 days.  She just started spotting again last night.  Reports improvement in her usual cramping over the last year.  Does note worsening hot flashes, now multiple times a day.  No vaginal dryness.  Also reports difficulty losing weight and feels cold when she is not having a hot flash.  Other associated symptoms include body aches, fatigue, and sleep disturbances.      Preventive Care   Immunization History   Administered Date(s) Administered    Hepatitis B Vaccine (Adol/Adult) 2019    Influenza Vac Subunit Quad Inj (Pf) 2019    Parkya SARS-CoV-2 Vaccine 2021    MMR Vaccine 2019, 2019    PFIZER PURPLE CAP SARS-COV-2 VACCINATION (12+) 2021    Tdap Vaccine 2019     Last Mammogram: 2021    Gynecology History and ROS  Current Sexual Activity: Yes  History of sexually transmitted diseases?  no  Abnormal discharge? No  Current Contraception:  None    Menstrual History  Patient's last menstrual period was 2023 (approximate).  Periods are irregular   Clots or heavy flow: No  Dysmenorrhea: No  Intermenstrual bleeding/spotting: No  Significant pain with periods:No  Bothersome PMS symptoms: No  Significant Pelvic Pain: No      Pap History  Last pap smear:  - due today  History of moderate or severe dysplasia: No    Cancer Risk Assessement:  Family history of:   - Breast cancer: yes - paternal cousin recently diagnosed at age 50yo   - Ovarian cancer: no   - Uterine cancer: no   - Colon cancer: no    Obstetric History  OB History    Para Term  AB Living   3 3 3     3   SAB IAB Ectopic Molar  Multiple Live Births             3      # Outcome Date GA Lbr Jesus/2nd Weight Sex Delivery Anes PTL Lv   3 Term 2008    F Non-Spontane   GAURI   2 Term 2003    F Non-Spontane   GAURI   1 Term 1999    F Non-Spontane   GAURI       Past Medical History  Past Medical History:   Diagnosis Date    Asthma     Hyperlipidemia        Past Surgical History  Past Surgical History:   Procedure Laterality Date    CHOLECYSTECTOMY      DILATION AND CURETTAGE      TONSILLECTOMY         Social History  Social History     Socioeconomic History    Marital status:      Spouse name: Not on file    Number of children: Not on file    Years of education: Not on file    Highest education level: Not on file   Occupational History    Not on file   Tobacco Use    Smoking status: Never    Smokeless tobacco: Never   Vaping Use    Vaping Use: Never used   Substance and Sexual Activity    Alcohol use: Yes     Alcohol/week: 1.2 oz     Types: 1 Glasses of wine, 1 Cans of beer per week     Comment: OCC     Drug use: Never    Sexual activity: Yes     Partners: Male     Birth control/protection: None, Male Sterilization     Comment:    Other Topics Concern    Not on file   Social History Narrative    Not on file     Social Determinants of Health     Financial Resource Strain: Not on file   Food Insecurity: Not on file   Transportation Needs: Not on file   Physical Activity: Not on file   Stress: Not on file   Social Connections: Not on file   Intimate Partner Violence: Not on file   Housing Stability: Not on file       Family History  Family History   Problem Relation Age of Onset    Hypertension Maternal Grandmother     Lung Disease Neg Hx     Cancer Neg Hx     Heart Disease Neg Hx     Diabetes Neg Hx     Hyperlipidemia Neg Hx        Home Medications  Current Outpatient Medications on File Prior to Visit   Medication Sig Dispense Refill    Levocetirizine Dihydrochloride 2.5 MG/5ML Solution       loratadine (CLARITIN) 10 MG Tab       fluticasone  "(FLONASE) 50 MCG/ACT nasal spray       montelukast (SINGULAIR) 10 MG Tab Take 1 tablet by mouth once daily 30 Tablet 5    Fluticasone Furoate-Vilanterol (BREO ELLIPTA) 200-25 MCG/INH AEROSOL POWDER, BREATH ACTIVATED Inhale 1 Puff every day. 28 Each 5    albuterol 108 (90 Base) MCG/ACT Aero Soln inhalation aerosol Inhale 2 Puffs every 6 hours as needed for Shortness of Breath. 8.5 g 11    methylPREDNISolone (MEDROL DOSEPAK) 4 MG Tablet Therapy Pack Follow schedule on package instructions. 21 Tablet 0    budesonide-formoterol (SYMBICORT) 160-4.5 MCG/ACT Aerosol Inhale 2 Puffs 2 times a day. 10.2 g 5     No current facility-administered medications on file prior to visit.       Allergies/Reactions  Allergies   Allergen Reactions    Amoxicillin Rash       ROS  Positive ROS: none  Gen: no fevers or chills, no significant weight loss or gain, excessive fatigue  Respiratory:  no cough or dyspnea  Cardiac:  no chest pain, no palpitations, no syncope  Breast: no breast discharge, pain, lump or skin changes  GI:  no heartburn, no abdominal pain, no nausea or vomiting  Urinary: no dysuria, urgency, frequency, incontinence   Psych: no depression or anxiety  Neuro: no migraines with aura, fainting spells, numbness or tingling  Extremities: no joint pain, persistently swollen ankles, recurrent leg cramps      Physical Examination:  Vital Signs:   Vitals:    04/13/23 0950   BP: 136/74   BP Location: Right arm   Patient Position: Sitting   BP Cuff Size: Adult   Weight: 177 lb   Height: 5' 10\"     Body mass index is 25.4 kg/m².    Constitutional: The patient is well developed and well nourished.  Psychiatric: Patient is oriented to time place and person.   Skin: No rash observed.  Neck: Appears symmetric. Thyroid normal size  Respiratory: normal effort  Breast: Inspection reveals no asymetry or nipple discharge, no skin thickening, dimpling or erythema.  Palpation demonstrates no masses.  Abdomen: Soft, non-tender.  Pelvic Exam:     " Vulva: external female genitalia are normal in appearance. No lesions    Urethra - no lesions, no erythema    Vagina: moist, pink, normal ruggae    Cervix: pink, smooth, no lesions, no CMT    Uterus - non-tender, normal size, shape, contour, mobile, anteverted    Ovaries: non-tender, no appreciable masses  Pap Smear performed: Yes  Extremeties: Legs are symmetric and without tenderness. There is no edema present.    Labs/Imaging:  HDL (mg/dL)   Date Value   04/23/2021 89     LDL (mg/dL)   Date Value   04/23/2021 132 (H)     No components found for: A1C1  WBC (K/uL)   Date Value   04/23/2021 6.5     Lab Results   Component Value Date/Time    CO2 24 04/23/2021 0830    BUN 13 04/23/2021 0830         Assessment & Plan  Betty Chicas is a 53 y.o. female who presents today for Annual Gyn Exam.     1. Encounter for gynecological examination (general) (routine) without abnormal findings  -- breast and pelvic exams benign  -- pap smear obtained today  -- would like hormones checked to assess menopausal status; order placed today  -- discussed we consider a patient postmenopausal once no period x12 months  -- mammo ordered  -- patient desires to discuss hot flashes and treatment options. Discussed various treatment options for menopause including natural remedies, non-hormonal prescription medications, and hormonal prescription medications. Natural remedies include lifestyle modifications, avoiding caffeine, dressing in layers, fans, weight loss, exercise, soy products, and black cohosh. Non-hormonal prescriptions include Brisdelle and SSRIs/antidepressants. Hormonal prescriptions include estrogen/progesterone combined or estrogen alone. We discussed risks of estrogen/progesterone HRT to include heart disease and stroke, especially in women over the age of 60. Possible small increased risk of breast cancer as well.  HRT can have side effects including bloating, breast tenderness or swelling, nausea, headaches, leg cramping,  and vaginal bleeding.  HRT can prevent osteoporosis. After thorough discussion of the available options, the patient elects for estradiol/progesterone.    Rx for estradiol/progesterone sent to the patient's pharmacy  Follow up in 2-3 months for re-evaluation of symptoms.  Call MD office with any questions/concerns     2. Menopausal and female climacteric states  -- see above  - TSH WITH REFLEX TO FT4; Future  - ESTRADIOL; Future  - PROGESTERONE; Future  - FSH/LH; Future    3. Screening for malignant neoplasm of cervix  -- see above  - THINPREP PAP WITH HPV; Future    4. Screening breast examination  -- see above  - MA-SCREENING MAMMO BILAT W/TOMOSYNTHESIS W/CAD; Future      Return: in 3 months, sooner bolivar Vidales D.O.

## 2023-04-13 NOTE — PROGRESS NOTES
Pt here for annual exam  Pt states that she has menopausal concerns.  Pharmacy verified   Good # [phone] (home)   Pap:2017  Mammo:5/13/21  Lmp:4/12/23  Bc:None

## 2023-04-14 LAB
CYTOLOGY REG CYTOL: NORMAL
HPV HR 12 DNA CVX QL NAA+PROBE: NEGATIVE
HPV16 DNA SPEC QL NAA+PROBE: NEGATIVE
HPV18 DNA SPEC QL NAA+PROBE: NEGATIVE
SPECIMEN SOURCE: NORMAL

## 2023-05-04 ENCOUNTER — HOSPITAL ENCOUNTER (OUTPATIENT)
Dept: RADIOLOGY | Facility: MEDICAL CENTER | Age: 53
End: 2023-05-04
Attending: STUDENT IN AN ORGANIZED HEALTH CARE EDUCATION/TRAINING PROGRAM
Payer: COMMERCIAL

## 2023-05-04 DIAGNOSIS — Z12.39 SCREENING BREAST EXAMINATION: ICD-10-CM

## 2023-05-04 PROCEDURE — 77063 BREAST TOMOSYNTHESIS BI: CPT

## 2024-02-27 ENCOUNTER — GYNECOLOGY VISIT (OUTPATIENT)
Dept: OBGYN | Facility: CLINIC | Age: 54
End: 2024-02-27
Payer: COMMERCIAL

## 2024-02-27 VITALS — DIASTOLIC BLOOD PRESSURE: 74 MMHG | BODY MASS INDEX: 26.54 KG/M2 | WEIGHT: 185 LBS | SYSTOLIC BLOOD PRESSURE: 145 MMHG

## 2024-02-27 DIAGNOSIS — Z12.39 ENCOUNTER FOR SCREENING FOR MALIGNANT NEOPLASM OF BREAST, UNSPECIFIED SCREENING MODALITY: ICD-10-CM

## 2024-02-27 DIAGNOSIS — Z79.890 HORMONE REPLACEMENT THERAPY (HRT): ICD-10-CM

## 2024-02-27 PROCEDURE — 3077F SYST BP >= 140 MM HG: CPT | Performed by: OBSTETRICS & GYNECOLOGY

## 2024-02-27 PROCEDURE — 3078F DIAST BP <80 MM HG: CPT | Performed by: OBSTETRICS & GYNECOLOGY

## 2024-02-27 PROCEDURE — 99213 OFFICE O/P EST LOW 20 MIN: CPT | Performed by: OBSTETRICS & GYNECOLOGY

## 2024-02-27 RX ORDER — PROGESTERONE 100 MG/1
100 CAPSULE ORAL DAILY
Qty: 30 CAPSULE | Refills: 11 | Status: SHIPPED | OUTPATIENT
Start: 2024-02-27 | End: 2024-03-13 | Stop reason: SDUPTHER

## 2024-02-27 ASSESSMENT — ENCOUNTER SYMPTOMS
NEUROLOGICAL NEGATIVE: 1
GASTROINTESTINAL NEGATIVE: 1
MUSCULOSKELETAL NEGATIVE: 1
CONSTITUTIONAL NEGATIVE: 1
CARDIOVASCULAR NEGATIVE: 1
PSYCHIATRIC NEGATIVE: 1
EYES NEGATIVE: 1
RESPIRATORY NEGATIVE: 1

## 2024-02-27 NOTE — PROGRESS NOTES
GYN PROBLEM VISIT    CC:  No chief complaint on file.    HPI: Patient is a 53 y.o.  who presents for refill of her hormone replacement therapy. She is perimenopausal and reports that she feels much better after starting it last year. Her periods have become very irregular. Last period was two weeks ago, prior to that it was three months. She has been counseled previously on the risks and benefits of HRT. It was noted that she was on an estrogen pill. I discussed with her changing to an estrogen patch as this is the safest method to receive estrogen. She is agreeable to this. She is due for a mammogram in May and a new prescription was provided.     ROS:   Review of Systems   Constitutional: Negative.    HENT: Negative.     Eyes: Negative.    Respiratory: Negative.     Cardiovascular: Negative.    Gastrointestinal: Negative.    Genitourinary: Negative.    Musculoskeletal: Negative.    Skin: Negative.    Neurological: Negative.    Endo/Heme/Allergies: Negative.    Psychiatric/Behavioral: Negative.           PFSH:  I personally reviewed the past medical and surgical histories.     Social History     Tobacco Use    Smoking status: Never    Smokeless tobacco: Never   Vaping Use    Vaping Use: Never used   Substance Use Topics    Alcohol use: Yes     Alcohol/week: 1.2 oz     Types: 1 Glasses of wine, 1 Cans of beer per week     Comment: OCC     Drug use: Never       Social History     Substance and Sexual Activity   Sexual Activity Yes    Partners: Male    Birth control/protection: None, Male Sterilization    Comment:         ALLERGIES / REACTIONS:  Allergies   Allergen Reactions    Amoxicillin Rash                           PHYSICAL EXAMINATION:  Vital Signs:   Vitals:    24 1055   BP: (!) 145/74   BP Location: Right arm   Patient Position: Sitting   Weight: 185 lb     Body mass index is 26.54 kg/m².    Physical Exam  Vitals reviewed.   Constitutional:       Appearance: Normal appearance.   HENT:       Head: Normocephalic.   Eyes:      Extraocular Movements: Extraocular movements intact.      Pupils: Pupils are equal, round, and reactive to light.   Cardiovascular:      Rate and Rhythm: Normal rate.   Pulmonary:      Effort: Pulmonary effort is normal.   Abdominal:      General: Abdomen is flat.      Palpations: Abdomen is soft.   Musculoskeletal:         General: Normal range of motion.      Cervical back: Normal range of motion.   Skin:     General: Skin is warm and dry.   Neurological:      General: No focal deficit present.      Mental Status: She is alert and oriented to person, place, and time.   Psychiatric:         Mood and Affect: Mood normal.         Behavior: Behavior normal.          ASSESSMENT AND PLAN:  53 y.o.  who presents for follow up of HRT    1. Hormone replacement therapy (HRT)  - estradiol (CLIMARA) 0.025 MG/24HR PATCH WEEKLY; Place 1 Patch on the skin every 7 days.  Dispense: 4 Patch; Refill: 0  - progesterone (PROMETRIUM) 100 MG Cap; Take 1 Capsule by mouth every day.  Dispense: 30 Capsule; Refill: 11    2. Encounter for screening for malignant neoplasm of breast, unspecified screening modality  - MA-SCREENING MAMMO BILAT W/TOMOSYNTHESIS W/CAD; Future    Plan:  After discussion about the risks and benefits, the patient elects to continue HRT. We discussed switching to an estrogen patch and she is agreeable to this. We will start with the lowest dose available and will increase if she experiences menopausal symptoms. She will send me a message on [employer] about the dose in 3 weeks  A new prescription for a mammogram was placed     Arben Chang M.D.  Obstetrics & Gynecology   [phone]  11:35 AM

## 2024-02-27 NOTE — PROGRESS NOTES
Patient here for GYN exam.  C/o  LMP=  BCM:  Last pap: 4/13/23 wnl  Last mammogram if applies:  Phone number:  Pharmacy verified

## 2024-03-13 DIAGNOSIS — Z79.890 HORMONE REPLACEMENT THERAPY (HRT): ICD-10-CM

## 2024-03-13 RX ORDER — PROGESTERONE 100 MG/1
100 CAPSULE ORAL DAILY
Qty: 30 CAPSULE | Refills: 11 | Status: SHIPPED | OUTPATIENT
Start: 2024-03-13

## 2024-05-06 ENCOUNTER — HOSPITAL ENCOUNTER (OUTPATIENT)
Dept: RADIOLOGY | Facility: MEDICAL CENTER | Age: 54
End: 2024-05-06
Attending: OBSTETRICS & GYNECOLOGY
Payer: COMMERCIAL

## 2024-05-06 DIAGNOSIS — Z12.39 ENCOUNTER FOR SCREENING FOR MALIGNANT NEOPLASM OF BREAST, UNSPECIFIED SCREENING MODALITY: ICD-10-CM

## 2024-05-08 DIAGNOSIS — Z79.890 HORMONE REPLACEMENT THERAPY (HRT): ICD-10-CM

## 2024-05-10 RX ORDER — PROGESTERONE 100 MG/1
100 CAPSULE ORAL DAILY
Qty: 30 CAPSULE | Refills: 2 | Status: SHIPPED | OUTPATIENT
Start: 2024-05-10

## 2024-09-21 DIAGNOSIS — Z79.890 HORMONE REPLACEMENT THERAPY (HRT): ICD-10-CM

## 2024-09-24 NOTE — TELEPHONE ENCOUNTER
Received request via: Patient    Was the patient seen in the last year in this department? Yes 2/27/24    Does the patient have an active prescription (recently filled or refills available) for     Does the patient have skilled nursing Plus and need 100-day supply? (This applies to ALL medications) Patient does not have SCP

## 2024-09-24 NOTE — TELEPHONE ENCOUNTER
Received request via: Pharmacy    Was the patient seen in the last year in this department? Yes    Does the patient have an active prescription (recently filled or refills available) for medication(s) requested? No    Pharmacy Name: [facility]    Does the patient have half-way Plus and need 100-day supply? (This applies to ALL medications) Patient does not have SCP

## 2024-09-26 RX ORDER — PROGESTERONE 100 MG/1
100 CAPSULE ORAL DAILY
Qty: 30 CAPSULE | Refills: 0 | Status: SHIPPED | OUTPATIENT
Start: 2024-09-26

## 2025-03-05 DIAGNOSIS — Z79.890 HORMONE REPLACEMENT THERAPY (HRT): ICD-10-CM

## 2025-03-07 NOTE — TELEPHONE ENCOUNTER
Received request via: Patient    Was the patient seen in the last year in this department? No 2.27.24 but has 4.23.25 appt with you. Pt informed to continue with this appt in order to receive future refills. Pt requesting courtesy refill in the meantime.    Does the patient have an active prescription (recently filled or refills available) for medication(s) requested? No    Pharmacy Name: [facility] [address]    Does the patient have alf Plus and need 100-day supply? (This applies to ALL medications) Patient does not have SCP

## 2025-03-09 RX ORDER — ESTRADIOL 0.03 MG/D
PATCH TRANSDERMAL
Qty: 4 PATCH | Refills: 0 | Status: SHIPPED | OUTPATIENT
Start: 2025-03-09

## 2025-04-23 ENCOUNTER — HOSPITAL ENCOUNTER (OUTPATIENT)
Dept: LAB | Facility: MEDICAL CENTER | Age: 55
End: 2025-04-23
Attending: OBSTETRICS & GYNECOLOGY
Payer: COMMERCIAL

## 2025-04-23 ENCOUNTER — GYNECOLOGY VISIT (OUTPATIENT)
Dept: OBGYN | Facility: CLINIC | Age: 55
End: 2025-04-23
Payer: COMMERCIAL

## 2025-04-23 VITALS — SYSTOLIC BLOOD PRESSURE: 135 MMHG | WEIGHT: 176.4 LBS | DIASTOLIC BLOOD PRESSURE: 74 MMHG | BODY MASS INDEX: 25.31 KG/M2

## 2025-04-23 DIAGNOSIS — Z79.890 HORMONE REPLACEMENT THERAPY (HRT): ICD-10-CM

## 2025-04-23 DIAGNOSIS — R68.82 LOW LIBIDO: ICD-10-CM

## 2025-04-23 DIAGNOSIS — Z12.11 SCREENING FOR COLON CANCER: ICD-10-CM

## 2025-04-23 DIAGNOSIS — Z12.31 ENCOUNTER FOR SCREENING MAMMOGRAM FOR MALIGNANT NEOPLASM OF BREAST: ICD-10-CM

## 2025-04-23 PROCEDURE — 99214 OFFICE O/P EST MOD 30 MIN: CPT | Performed by: OBSTETRICS & GYNECOLOGY

## 2025-04-23 PROCEDURE — 84403 ASSAY OF TOTAL TESTOSTERONE: CPT

## 2025-04-23 PROCEDURE — 84402 ASSAY OF FREE TESTOSTERONE: CPT

## 2025-04-23 PROCEDURE — 36415 COLL VENOUS BLD VENIPUNCTURE: CPT

## 2025-04-23 PROCEDURE — 3075F SYST BP GE 130 - 139MM HG: CPT | Performed by: OBSTETRICS & GYNECOLOGY

## 2025-04-23 PROCEDURE — 3078F DIAST BP <80 MM HG: CPT | Performed by: OBSTETRICS & GYNECOLOGY

## 2025-04-23 PROCEDURE — 84270 ASSAY OF SEX HORMONE GLOBUL: CPT

## 2025-04-23 RX ORDER — PROGESTERONE 100 MG/1
100 CAPSULE ORAL DAILY
Qty: 90 CAPSULE | Refills: 3 | Status: SHIPPED | OUTPATIENT
Start: 2025-04-23

## 2025-04-23 RX ORDER — TESTOSTERONE GEL, 1% 10 MG/G
50 GEL TRANSDERMAL DAILY
Qty: 9 G | Refills: 0 | Status: SHIPPED | OUTPATIENT
Start: 2025-04-23 | End: 2025-10-20

## 2025-04-23 RX ORDER — ESTRADIOL 0.04 MG/D
1 PATCH TRANSDERMAL
Qty: 12 PATCH | Refills: 3 | Status: SHIPPED | OUTPATIENT
Start: 2025-04-23

## 2025-04-23 ASSESSMENT — ENCOUNTER SYMPTOMS
EYES NEGATIVE: 1
MUSCULOSKELETAL NEGATIVE: 1
RESPIRATORY NEGATIVE: 1
PSYCHIATRIC NEGATIVE: 1
GASTROINTESTINAL NEGATIVE: 1
CARDIOVASCULAR NEGATIVE: 1
NEUROLOGICAL NEGATIVE: 1

## 2025-04-23 NOTE — PROGRESS NOTES
GYN PROBLEM VISIT    CC:  Medication Follow-up (Estradiol + progesterone)    HPI: Patient is a 55 y.o.  who presents for follow up with hormone replacement therapy. She is doing well. She reports that her brain fog is significantly improved with the estrogen patch and the oral progesterone. She is still occasionally having hot flashes although they are much improved. She continues to feel like the benefits outweigh the risks and she would like to continue. She would like to slightly increase her estrogen dose to decrease the frequency of hot flashes. Her dose was increased and her progesterone was refilled.     We discussed low libido. I discussed that transdermal testosterone can be used for libido but has risks. We discussed that there is no long term data for women. We discussed the risk of male pattern hair growth clitoromegaly, mood changes, and the unknown affect on hormone sensitive cancers. She is very bothered by her low libido and would like to try it. A baseline testosterone level was drawn today and another one was ordered for 3 months from now.     Lastly, she is due for a screening mammogram and colonoscopy. Referrals for these services were placed today.       ROS:   Review of Systems   Constitutional:         Occasional hot flashes   HENT: Negative.     Eyes: Negative.    Respiratory: Negative.     Cardiovascular: Negative.    Gastrointestinal: Negative.    Genitourinary: Negative.    Musculoskeletal: Negative.    Skin: Negative.    Neurological: Negative.    Endo/Heme/Allergies: Negative.    Psychiatric/Behavioral: Negative.           PFSH:  I personally reviewed the past medical and surgical histories.     Social History     Tobacco Use    Smoking status: Never    Smokeless tobacco: Never   Vaping Use    Vaping status: Never Used   Substance Use Topics    Alcohol use: Yes     Alcohol/week: 1.2 oz     Types: 1 Glasses of wine, 1 Cans of beer per week     Comment: OCC     Drug use: Never        Social History     Substance and Sexual Activity   Sexual Activity Yes    Partners: Male    Birth control/protection: None, Male Sterilization    Comment:         ALLERGIES / REACTIONS:  Allergies   Allergen Reactions    Amoxicillin Rash                           PHYSICAL EXAMINATION:  Vital Signs:   Vitals:    25 1109   BP: 135/74   Weight: 176 lb 6.4 oz     Body mass index is 25.31 kg/m².    Physical Exam  Vitals reviewed.   Constitutional:       Appearance: Normal appearance.   HENT:      Head: Normocephalic.   Eyes:      Extraocular Movements: Extraocular movements intact.      Pupils: Pupils are equal, round, and reactive to light.   Cardiovascular:      Rate and Rhythm: Normal rate.   Pulmonary:      Effort: Pulmonary effort is normal.   Abdominal:      General: Abdomen is flat.      Palpations: Abdomen is soft.   Musculoskeletal:         General: Normal range of motion.      Cervical back: Normal range of motion.   Skin:     General: Skin is warm and dry.   Neurological:      General: No focal deficit present.      Mental Status: She is alert and oriented to person, place, and time.   Psychiatric:         Mood and Affect: Mood normal.         Behavior: Behavior normal.          ASSESSMENT AND PLAN:  55 y.o.    1. Hormone replacement therapy (HRT)  - estradiol (CLIMARA) 0.0375 MG/24HR patch; Place 1 Patch on the skin every 7 days.  Dispense: 12 Patch; Refill: 3  - progesterone (PROMETRIUM) 100 MG Cap; Take 1 Capsule by mouth every day.  Dispense: 90 Capsule; Refill: 3    2. Encounter for screening mammogram for malignant neoplasm of breast  - MA-SCREENING MAMMO BILAT W/TOMOSYNTHESIS W/CAD; Future    3. Screening for colon cancer  - COLONOSCOPY    4. Low libido  - testosterone (TESTIM/ANDROGEL) 50 MG/5GM (1%) Gel gel; Place 50 mg on the skin every day for 180 days.  Dispense: 9 g; Refill: 0  - TESTOSTERONE F&T FEMALES/CHILD; Standing    Plan:  See above for counseling  A year's refill  of estrogen and progesterone was sent to her pharmacy  Transdermal testosterone was sent with instructions for female dosing. A baseline testosterone level was ordered today along with a repeat order for 3 months from now  She was referred for her mammogram and colonoscopy   She will return to clinic in about 3 months to follow up the testosterone. She was counseled on the option of sending it to the compounding pharmacy if her insurance doesn't cover it and it is exceedingly expensive     Arben Chang M.D.  Obstetrics & Gynecology   [phone]  12:34 PM

## 2025-04-30 LAB
SHBG SERPL-SCNC: 58 NMOL/L (ref 17–125)
TESTOST FREE SERPL-MCNC: 0.9 PG/ML (ref 0.6–3.8)
TESTOST SERPL-MCNC: 8 NG/DL (ref 9–55)

## 2025-05-01 ENCOUNTER — RESULTS FOLLOW-UP (OUTPATIENT)
Dept: OBGYN | Facility: CLINIC | Age: 55
End: 2025-05-01

## 2025-05-08 ENCOUNTER — HOSPITAL ENCOUNTER (OUTPATIENT)
Dept: RADIOLOGY | Facility: MEDICAL CENTER | Age: 55
End: 2025-05-08
Attending: OBSTETRICS & GYNECOLOGY
Payer: COMMERCIAL

## 2025-05-08 DIAGNOSIS — Z12.31 ENCOUNTER FOR SCREENING MAMMOGRAM FOR MALIGNANT NEOPLASM OF BREAST: ICD-10-CM

## 2025-05-08 PROCEDURE — 77067 SCR MAMMO BI INCL CAD: CPT

## 2025-05-09 ENCOUNTER — RESULTS FOLLOW-UP (OUTPATIENT)
Dept: OBGYN | Facility: CLINIC | Age: 55
End: 2025-05-09

## 2025-05-15 DIAGNOSIS — R68.82 LOW LIBIDO: Primary | ICD-10-CM

## 2025-06-03 DIAGNOSIS — R68.82 LOW LIBIDO: ICD-10-CM

## 2025-06-03 RX ORDER — TESTOSTERONE GEL, 1% 10 MG/G
50 GEL TRANSDERMAL DAILY
Qty: 5 G | Refills: 1 | Status: SHIPPED | OUTPATIENT
Start: 2025-06-03 | End: 2025-06-03 | Stop reason: SDUPTHER

## 2025-06-03 RX ORDER — TESTOSTERONE GEL, 1% 10 MG/G
50 GEL TRANSDERMAL DAILY
Qty: 5 G | Refills: 1 | Status: SHIPPED | OUTPATIENT
Start: 2025-06-03 | End: 2025-11-30

## 2025-08-08 ENCOUNTER — APPOINTMENT (OUTPATIENT)
Dept: LAB | Facility: MEDICAL CENTER | Age: 55
End: 2025-08-08
Payer: COMMERCIAL

## 2025-08-12 ENCOUNTER — HOSPITAL ENCOUNTER (OUTPATIENT)
Dept: LAB | Facility: MEDICAL CENTER | Age: 55
End: 2025-08-12
Attending: OBSTETRICS & GYNECOLOGY
Payer: COMMERCIAL

## 2025-08-12 DIAGNOSIS — R68.82 LOW LIBIDO: ICD-10-CM

## 2025-08-12 PROCEDURE — 84403 ASSAY OF TOTAL TESTOSTERONE: CPT

## 2025-08-12 PROCEDURE — 84402 ASSAY OF FREE TESTOSTERONE: CPT

## 2025-08-12 PROCEDURE — 84270 ASSAY OF SEX HORMONE GLOBUL: CPT

## 2025-08-19 ENCOUNTER — GYNECOLOGY VISIT (OUTPATIENT)
Facility: MEDICAL CENTER | Age: 55
End: 2025-08-19
Payer: COMMERCIAL

## 2025-08-19 VITALS
SYSTOLIC BLOOD PRESSURE: 110 MMHG | HEART RATE: 64 BPM | DIASTOLIC BLOOD PRESSURE: 68 MMHG | BODY MASS INDEX: 24.54 KG/M2 | WEIGHT: 171 LBS

## 2025-08-19 DIAGNOSIS — Z79.890 HORMONE REPLACEMENT THERAPY (HRT): Primary | ICD-10-CM

## 2025-08-19 PROCEDURE — 3078F DIAST BP <80 MM HG: CPT | Performed by: OBSTETRICS & GYNECOLOGY

## 2025-08-19 PROCEDURE — 99213 OFFICE O/P EST LOW 20 MIN: CPT | Performed by: OBSTETRICS & GYNECOLOGY

## 2025-08-19 PROCEDURE — 3074F SYST BP LT 130 MM HG: CPT | Performed by: OBSTETRICS & GYNECOLOGY

## 2025-08-19 RX ORDER — TESTOSTERONE 1.62 MG/G
20.25 GEL TRANSDERMAL DAILY
Qty: 88 G | Refills: 0 | Status: SHIPPED | OUTPATIENT
Start: 2025-08-19 | End: 2026-01-23

## 2025-08-19 ASSESSMENT — ENCOUNTER SYMPTOMS
CONSTITUTIONAL NEGATIVE: 1
RESPIRATORY NEGATIVE: 1
PSYCHIATRIC NEGATIVE: 1
NEUROLOGICAL NEGATIVE: 1
GASTROINTESTINAL NEGATIVE: 1
MUSCULOSKELETAL NEGATIVE: 1
CARDIOVASCULAR NEGATIVE: 1
EYES NEGATIVE: 1

## 2025-08-20 DIAGNOSIS — Z79.890 HORMONE REPLACEMENT THERAPY (HRT): Primary | ICD-10-CM

## 2025-08-20 LAB
SHBG SERPL-SCNC: 67 NMOL/L (ref 17–125)
TESTOST FREE SERPL-MCNC: 13.5 PG/ML (ref 0.6–3.8)
TESTOST SERPL-MCNC: 124 NG/DL (ref 9–55)